# Patient Record
Sex: FEMALE | Race: WHITE | Employment: FULL TIME
[De-identification: names, ages, dates, MRNs, and addresses within clinical notes are randomized per-mention and may not be internally consistent; named-entity substitution may affect disease eponyms.]

---

## 2018-04-04 ENCOUNTER — NURSE TRIAGE (OUTPATIENT)
Dept: OTHER | Facility: CLINIC | Age: 23
End: 2018-04-04

## 2018-04-05 ENCOUNTER — OFFICE VISIT (OUTPATIENT)
Dept: FAMILY MEDICINE CLINIC | Age: 23
End: 2018-04-05

## 2018-04-05 VITALS
WEIGHT: 232 LBS | DIASTOLIC BLOOD PRESSURE: 70 MMHG | RESPIRATION RATE: 16 BRPM | BODY MASS INDEX: 37.28 KG/M2 | SYSTOLIC BLOOD PRESSURE: 108 MMHG | HEIGHT: 66 IN | HEART RATE: 91 BPM | OXYGEN SATURATION: 98 %

## 2018-04-05 DIAGNOSIS — F43.10 PTSD (POST-TRAUMATIC STRESS DISORDER): ICD-10-CM

## 2018-04-05 DIAGNOSIS — F41.0 PANIC ATTACK: Primary | ICD-10-CM

## 2018-04-05 DIAGNOSIS — F41.1 GAD (GENERALIZED ANXIETY DISORDER): ICD-10-CM

## 2018-04-05 PROCEDURE — 99203 OFFICE O/P NEW LOW 30 MIN: CPT | Performed by: FAMILY MEDICINE

## 2018-04-05 PROCEDURE — G0444 DEPRESSION SCREEN ANNUAL: HCPCS | Performed by: FAMILY MEDICINE

## 2018-04-05 RX ORDER — HYDROXYZINE PAMOATE 25 MG/1
25 CAPSULE ORAL 3 TIMES DAILY PRN
COMMUNITY
End: 2018-04-25

## 2018-04-05 RX ORDER — BUSPIRONE HYDROCHLORIDE 15 MG/1
15 TABLET ORAL 3 TIMES DAILY
Status: ON HOLD | COMMUNITY
End: 2018-06-25

## 2018-04-05 RX ORDER — ESCITALOPRAM OXALATE 10 MG/1
10 TABLET ORAL DAILY
Status: ON HOLD | COMMUNITY
End: 2018-06-25 | Stop reason: HOSPADM

## 2018-04-05 RX ORDER — LORAZEPAM 0.5 MG/1
0.5 TABLET ORAL EVERY 8 HOURS PRN
Qty: 30 TABLET | Refills: 1 | Status: SHIPPED | OUTPATIENT
Start: 2018-04-05 | End: 2018-05-05

## 2018-04-05 ASSESSMENT — PATIENT HEALTH QUESTIONNAIRE - PHQ9
1. LITTLE INTEREST OR PLEASURE IN DOING THINGS: 1
9. THOUGHTS THAT YOU WOULD BE BETTER OFF DEAD, OR OF HURTING YOURSELF: 0
SUM OF ALL RESPONSES TO PHQ QUESTIONS 1-9: 19
2. FEELING DOWN, DEPRESSED OR HOPELESS: 2
3. TROUBLE FALLING OR STAYING ASLEEP: 3
7. TROUBLE CONCENTRATING ON THINGS, SUCH AS READING THE NEWSPAPER OR WATCHING TELEVISION: 3
5. POOR APPETITE OR OVEREATING: 2
6. FEELING BAD ABOUT YOURSELF - OR THAT YOU ARE A FAILURE OR HAVE LET YOURSELF OR YOUR FAMILY DOWN: 3
8. MOVING OR SPEAKING SO SLOWLY THAT OTHER PEOPLE COULD HAVE NOTICED. OR THE OPPOSITE, BEING SO FIGETY OR RESTLESS THAT YOU HAVE BEEN MOVING AROUND A LOT MORE THAN USUAL: 2
SUM OF ALL RESPONSES TO PHQ9 QUESTIONS 1 & 2: 3
10. IF YOU CHECKED OFF ANY PROBLEMS, HOW DIFFICULT HAVE THESE PROBLEMS MADE IT FOR YOU TO DO YOUR WORK, TAKE CARE OF THINGS AT HOME, OR GET ALONG WITH OTHER PEOPLE: 3
4. FEELING TIRED OR HAVING LITTLE ENERGY: 3

## 2018-04-05 ASSESSMENT — ENCOUNTER SYMPTOMS
RESPIRATORY NEGATIVE: 1
GASTROINTESTINAL NEGATIVE: 1

## 2018-04-10 RX ORDER — BUPROPION HYDROCHLORIDE 150 MG/1
150 TABLET ORAL EVERY MORNING
Qty: 30 TABLET | Refills: 1 | Status: SHIPPED | OUTPATIENT
Start: 2018-04-10 | End: 2018-04-25 | Stop reason: SDUPTHER

## 2018-04-13 ENCOUNTER — TELEPHONE (OUTPATIENT)
Dept: FAMILY MEDICINE CLINIC | Age: 23
End: 2018-04-13

## 2018-04-16 ENCOUNTER — TELEPHONE (OUTPATIENT)
Dept: FAMILY MEDICINE CLINIC | Age: 23
End: 2018-04-16

## 2018-04-25 ENCOUNTER — OFFICE VISIT (OUTPATIENT)
Dept: FAMILY MEDICINE CLINIC | Age: 23
End: 2018-04-25

## 2018-04-25 VITALS
HEIGHT: 66 IN | OXYGEN SATURATION: 98 % | RESPIRATION RATE: 16 BRPM | HEART RATE: 84 BPM | SYSTOLIC BLOOD PRESSURE: 108 MMHG | BODY MASS INDEX: 37.77 KG/M2 | WEIGHT: 235 LBS | DIASTOLIC BLOOD PRESSURE: 70 MMHG

## 2018-04-25 DIAGNOSIS — F41.1 GAD (GENERALIZED ANXIETY DISORDER): Primary | ICD-10-CM

## 2018-04-25 DIAGNOSIS — F33.41 RECURRENT MAJOR DEPRESSIVE DISORDER, IN PARTIAL REMISSION (HCC): ICD-10-CM

## 2018-04-25 PROCEDURE — 99213 OFFICE O/P EST LOW 20 MIN: CPT | Performed by: FAMILY MEDICINE

## 2018-04-25 RX ORDER — BUPROPION HYDROCHLORIDE 150 MG/1
150 TABLET ORAL 2 TIMES DAILY
Qty: 180 TABLET | Refills: 0 | Status: ON HOLD | OUTPATIENT
Start: 2018-04-25 | End: 2018-06-25

## 2018-04-25 ASSESSMENT — ENCOUNTER SYMPTOMS
GASTROINTESTINAL NEGATIVE: 1
RESPIRATORY NEGATIVE: 1

## 2018-06-22 ENCOUNTER — NURSE TRIAGE (OUTPATIENT)
Dept: OTHER | Facility: CLINIC | Age: 23
End: 2018-06-22

## 2018-06-23 PROBLEM — Z78.9 ELECTRONIC CIGARETTE USE: Status: ACTIVE | Noted: 2018-06-23

## 2018-06-23 PROBLEM — F39 MOOD DISORDER (HCC): Status: ACTIVE | Noted: 2018-06-23

## 2018-06-25 PROBLEM — F39 MOOD DISORDER (HCC): Status: RESOLVED | Noted: 2018-06-23 | Resolved: 2018-06-25

## 2018-06-25 PROBLEM — Z78.9 ELECTRONIC CIGARETTE USE: Status: RESOLVED | Noted: 2018-06-23 | Resolved: 2018-06-25

## 2018-07-05 ENCOUNTER — OFFICE VISIT (OUTPATIENT)
Dept: FAMILY MEDICINE CLINIC | Age: 23
End: 2018-07-05

## 2018-07-05 VITALS
DIASTOLIC BLOOD PRESSURE: 80 MMHG | BODY MASS INDEX: 39.65 KG/M2 | WEIGHT: 238 LBS | SYSTOLIC BLOOD PRESSURE: 110 MMHG | HEIGHT: 65 IN

## 2018-07-05 DIAGNOSIS — F41.1 GAD (GENERALIZED ANXIETY DISORDER): ICD-10-CM

## 2018-07-05 DIAGNOSIS — F33.41 RECURRENT MAJOR DEPRESSIVE DISORDER, IN PARTIAL REMISSION (HCC): Primary | ICD-10-CM

## 2018-07-05 PROCEDURE — 99214 OFFICE O/P EST MOD 30 MIN: CPT | Performed by: FAMILY MEDICINE

## 2018-07-05 ASSESSMENT — ENCOUNTER SYMPTOMS
RESPIRATORY NEGATIVE: 1
GASTROINTESTINAL NEGATIVE: 1

## 2018-07-05 NOTE — PROGRESS NOTES
Subjective:      Patient ID: Elzbieta Smalls is a 23 y.o. female. HPI  Depression  Pt was admitted to [facility] psych   She went to the ER thinking of hurting herself   Her parents took her  She was admitted and had therapy  She is feeling better  They changed her meds to wellbutrin to xl   No longer feeling suicidal  She is going to therapy  Needs names of therapist   She got a new job at [employer]  She is very excited about this  Going to do group therapy with her parents   Review of Systems   Constitutional: Negative. Respiratory: Negative. Cardiovascular: Negative. Gastrointestinal: Negative. Skin: Negative. Neurological: Negative. Psychiatric/Behavioral: Positive for agitation and decreased concentration. YOB: 1995    Date of Visit:  7/5/2018    No Known Allergies    Outpatient Prescriptions Marked as Taking for the 7/5/18 encounter (Office Visit) with Toro Barrett, DO   Medication Sig Dispense Refill    busPIRone (BUSPAR) 15 MG tablet Take 15 mg by mouth 3 times daily 90 tablet 0    buPROPion (WELLBUTRIN XL) 300 MG extended release tablet Take 1 tablet by mouth every morning 30 tablet 0       Vitals:    07/05/18 1138   BP: 110/80   Weight: 238 lb (108 kg)   Height: 5' 5\" (1.651 m)     Body mass index is 39.61 kg/m². Wt Readings from Last 3 Encounters:   07/05/18 238 lb (108 kg)   06/23/18 190 lb (86.2 kg)   06/22/18 235 lb 3.7 oz (106.7 kg)     BP Readings from Last 3 Encounters:   07/05/18 110/80   06/25/18 110/73   06/23/18 118/82       Objective:   Physical Exam   Constitutional: She is oriented to person, place, and time. She appears well-developed and well-nourished. HENT:   Head: Normocephalic. Neck: No thyromegaly present. Cardiovascular: Normal rate, regular rhythm and normal heart sounds. Pulmonary/Chest: Effort normal and breath sounds normal.   Lymphadenopathy:     She has no cervical adenopathy.    Neurological: She is alert and oriented to person, place, and time. Psychiatric: She has a normal mood and affect. Her behavior is normal. Judgment and thought content normal.   Smiling  Interactive    Nursing note and vitals reviewed.       Assessment:      Assessment/plan;  Sam Rivera was seen today for follow-up from hospital.    Diagnoses and all orders for this visit:    Recurrent major depressive disorder, in partial remission ([facility] [address]    GAVINO (generalized anxiety disorder)      Support given  Given names of therapist   Call if increasing symptoms

## 2018-10-03 ENCOUNTER — PATIENT MESSAGE (OUTPATIENT)
Dept: FAMILY MEDICINE CLINIC | Age: 23
End: 2018-10-03

## 2018-10-04 RX ORDER — BUPROPION HYDROCHLORIDE 300 MG/1
300 TABLET ORAL EVERY MORNING
Qty: 30 TABLET | Refills: 5 | Status: SHIPPED | OUTPATIENT
Start: 2018-10-04 | End: 2018-11-20 | Stop reason: SDUPTHER

## 2019-11-08 ENCOUNTER — OFFICE VISIT (OUTPATIENT)
Dept: FAMILY MEDICINE CLINIC | Age: 24
End: 2019-11-08
Payer: COMMERCIAL

## 2019-11-08 ENCOUNTER — TELEPHONE (OUTPATIENT)
Dept: FAMILY MEDICINE CLINIC | Age: 24
End: 2019-11-08

## 2019-11-08 VITALS
BODY MASS INDEX: 40.77 KG/M2 | SYSTOLIC BLOOD PRESSURE: 110 MMHG | HEART RATE: 78 BPM | DIASTOLIC BLOOD PRESSURE: 64 MMHG | OXYGEN SATURATION: 100 % | WEIGHT: 245 LBS

## 2019-11-08 DIAGNOSIS — J01.90 ACUTE BACTERIAL SINUSITIS: Primary | ICD-10-CM

## 2019-11-08 DIAGNOSIS — B96.89 ACUTE BACTERIAL SINUSITIS: Primary | ICD-10-CM

## 2019-11-08 PROCEDURE — 99213 OFFICE O/P EST LOW 20 MIN: CPT | Performed by: FAMILY MEDICINE

## 2019-11-08 RX ORDER — AZITHROMYCIN 250 MG/1
250 TABLET, FILM COATED ORAL SEE ADMIN INSTRUCTIONS
Qty: 6 TABLET | Refills: 0 | Status: SHIPPED | OUTPATIENT
Start: 2019-11-08 | End: 2019-11-13

## 2019-11-08 RX ORDER — PROMETHAZINE HYDROCHLORIDE AND CODEINE PHOSPHATE 6.25; 1 MG/5ML; MG/5ML
5 SYRUP ORAL EVERY 4 HOURS PRN
Qty: 118 ML | Refills: 0 | Status: SHIPPED | OUTPATIENT
Start: 2019-11-08 | End: 2019-11-15

## 2019-11-10 ASSESSMENT — ENCOUNTER SYMPTOMS
SINUS PRESSURE: 1
HOARSE VOICE: 1
COUGH: 1

## 2019-11-20 ENCOUNTER — OFFICE VISIT (OUTPATIENT)
Dept: FAMILY MEDICINE CLINIC | Age: 24
End: 2019-11-20
Payer: COMMERCIAL

## 2019-11-20 VITALS
HEIGHT: 65 IN | SYSTOLIC BLOOD PRESSURE: 110 MMHG | TEMPERATURE: 98.8 F | DIASTOLIC BLOOD PRESSURE: 70 MMHG | BODY MASS INDEX: 41.32 KG/M2 | WEIGHT: 248 LBS

## 2019-11-20 DIAGNOSIS — L25.9 CONTACT DERMATITIS, UNSPECIFIED CONTACT DERMATITIS TYPE, UNSPECIFIED TRIGGER: Primary | ICD-10-CM

## 2019-11-20 PROCEDURE — 99213 OFFICE O/P EST LOW 20 MIN: CPT | Performed by: FAMILY MEDICINE

## 2019-11-20 RX ORDER — PREDNISONE 10 MG/1
10 TABLET ORAL DAILY
Qty: 18 TABLET | Refills: 0 | Status: SHIPPED | OUTPATIENT
Start: 2019-11-20 | End: 2020-11-19

## 2019-11-20 RX ORDER — MOMETASONE FUROATE 1 MG/G
CREAM TOPICAL
Qty: 50 G | Refills: 1 | Status: SHIPPED | OUTPATIENT
Start: 2019-11-20

## 2020-07-14 ENCOUNTER — E-VISIT (OUTPATIENT)
Dept: FAMILY MEDICINE CLINIC | Age: 25
End: 2020-07-14
Payer: COMMERCIAL

## 2020-07-14 PROCEDURE — 99421 OL DIG E/M SVC 5-10 MIN: CPT | Performed by: FAMILY MEDICINE

## 2020-07-14 RX ORDER — NITROFURANTOIN 25; 75 MG/1; MG/1
100 CAPSULE ORAL 2 TIMES DAILY
Qty: 10 CAPSULE | Refills: 0 | Status: SHIPPED | OUTPATIENT
Start: 2020-07-14 | End: 2020-07-19

## 2020-07-22 ENCOUNTER — TELEPHONE (OUTPATIENT)
Dept: FAMILY MEDICINE CLINIC | Age: 25
End: 2020-07-22

## 2020-07-22 NOTE — TELEPHONE ENCOUNTER
Patient's Partner, Magdalena Julio on [facility], is requesting an urgent returned call from Dr. Aurelio Woods. States patient is having mental health issues and she needs Dr. Jose Cruz Godoy opinion before she takes her somewhere to get help.

## 2020-07-23 ENCOUNTER — PATIENT MESSAGE (OUTPATIENT)
Dept: FAMILY MEDICINE CLINIC | Age: 25
End: 2020-07-23

## 2020-07-23 NOTE — TELEPHONE ENCOUNTER
Pt called back with a different number [phone] to call her back. Pt is having really bad anger issues and  uncontrollable emotions. She is looking to get back on some meds. Please give pt a call.

## 2020-07-23 NOTE — TELEPHONE ENCOUNTER
LMOM for patient to call office to make an appt. Dr Maurice Pena would like to set her up with a MyChart visit tomorrow 7/24 in the 3:45 red clinic spot.

## 2020-07-23 NOTE — TELEPHONE ENCOUNTER
From: Giovanni Oh  To: Ethel Wallace DO  Sent: 7/23/2020 10:33 AM EDT  Subject: Non-Urgent Medical Question    Hello,  I called yesterday and asked to see doctor Cris Koyanagi due to my manic episodes and talk about a new plan we maybe have to do for my depression     Thank,   Giovanni Oh

## 2020-07-24 ENCOUNTER — TELEMEDICINE (OUTPATIENT)
Dept: FAMILY MEDICINE CLINIC | Age: 25
End: 2020-07-24
Payer: COMMERCIAL

## 2020-07-24 PROCEDURE — 99214 OFFICE O/P EST MOD 30 MIN: CPT | Performed by: FAMILY MEDICINE

## 2020-07-24 RX ORDER — LORAZEPAM 0.5 MG/1
0.5 TABLET ORAL EVERY 8 HOURS PRN
Qty: 20 TABLET | Refills: 0 | Status: SHIPPED | OUTPATIENT
Start: 2020-07-24 | End: 2020-08-12 | Stop reason: SDUPTHER

## 2020-07-24 RX ORDER — BUPROPION HYDROCHLORIDE 150 MG/1
150 TABLET ORAL EVERY MORNING
Qty: 30 TABLET | Refills: 3 | Status: SHIPPED | OUTPATIENT
Start: 2020-07-24

## 2020-07-24 NOTE — PROGRESS NOTES
2020    TELEHEALTH EVALUATION -- Audio/Visual (During [facility]DBJC-98 public health emergency)    HPI:    Felton Skiff (:  1995) has requested an audio/video evaluation for the following concern(s): Anxiety     She has a history of depression and anxiety  She has been off medicine for a few years  She had about one week of \"manic\" episode  Could not settle down  Was very angry for no reason   Her fiance Raf Carter stayed with her the whole time   She punched hole in wall  Did not go to work  She was angry the whole time   Feeling better now but down  She was on wellbutrin in the past and this did help   Was having panic attacks and anxiety        Review of Systems   Constitutional: Positive for activity change and fatigue. Psychiatric/Behavioral: Positive for agitation, behavioral problems, decreased concentration and sleep disturbance. The patient is nervous/anxious. Prior to Visit Medications    Medication Sig Taking? Authorizing Provider   predniSONE (DELTASONE) 10 MG tablet Take 1 tablet by mouth daily 3 pills q days 1-3  2 pills q days 4-6 1 pill q days 7-9  Rory Cruz DO   mometasone (ELOCON) 0.1 % cream Apply bid to affected area  Levi Lindsey, DO       Social History     Tobacco Use    Smoking status: Never Smoker    Smokeless tobacco: Never Used   Substance Use Topics    Alcohol use: Yes     Alcohol/week: 3.0 standard drinks     Types: 2 Glasses of wine, 1 Cans of beer per week    Drug use:  No            PHYSICAL EXAMINATION:  [ INSTRUCTIONS:  \"[x]\" Indicates a positive item  \"[]\" Indicates a negative item  -- DELETE ALL ITEMS NOT EXAMINED]  Vital Signs: (As obtained by patient/caregiver or practitioner observation)    Blood pressure-  Heart rate-    Respiratory rate-    Temperature-  Pulse oximetry-     Constitutional: [x] Appears well-developed and well-nourished [x] No apparent distress      [] Abnormal-   Mental status  [x] Alert and awake  [x] Oriented to person/place/time [x]Able to follow commands      Eyes:  EOM    []  Normal  [] Abnormal-  Sclera  []  Normal  [] Abnormal -         Discharge []  None visible  [] Abnormal -    HENT:   [x] Normocephalic, atraumatic. [] Abnormal   [] Mouth/Throat: Mucous membranes are moist.     External Ears [] Normal  [] Abnormal-     Neck: [] No visualized mass     Pulmonary/Chest: [x] Respiratory effort normal.  [x] No visualized signs of difficulty breathing or respiratory distress        [] Abnormal-      Musculoskeletal:   [] Normal gait with no signs of ataxia         [] Normal range of motion of neck        [] Abnormal-       Neurological:        [x] No Facial Asymmetry (Cranial nerve 7 motor function) (limited exam to video visit)          [] No gaze palsy        [] Abnormal-         Skin:        [x] No significant exanthematous lesions or discoloration noted on facial skin         [] Abnormal-            Psychiatric:       [x] Normal Affect [x] No Hallucinations        [] Abnormal-     Other pertinent observable physical exam findings-     ASSESSMENT/PLAN:  Assessment/plan;  Diagnoses and all orders for this visit:    GAVINO (generalized anxiety disorder)  -     LORazepam (ATIVAN) 0.5 MG tablet; Take 1 tablet by mouth every 8 hours as needed for Anxiety for up to 10 days. Other orders  -     buPROPion (WELLBUTRIN XL) 150 MG extended release tablet; Take 1 tablet by mouth every morning      Recheck in two weeks  Note for work  Her fiance on the video visit also and agrees she will manage the ativan and only give them to her one at a time when needed  Nicol Yates is a 23 y.o. female being evaluated by a Virtual Visit (video visit) encounter to address concerns as mentioned above. A caregiver was present when appropriate. Due to this being a TeleHealth encounter (During [facility]-52 public health emergency), evaluation of the following organ systems was limited: Vitals/Constitutional/EENT/Resp/CV/GI//MS/Neuro/Skin/Heme-Lymph-Imm. Pursuant to the emergency declaration under the [facility], [address] [place] [address] authority and the [employer] and [employer] Act, this Virtual Visit was conducted with patient's (and/or legal guardian's) consent, to reduce the patient's risk of exposure to COVID-19 and provide necessary medical care. The patient (and/or legal guardian) has also been advised to contact this office for worsening conditions or problems, and seek emergency medical treatment and/or call 911 if deemed necessary. Patient identification was verified at the start of the visit: Yes    Total time spent on this encounter: 20 minutes    Services were provided through a video synchronous discussion virtually to substitute for in-person clinic visit. Patient and provider were located at their individual homes. --[address] [place] [address],  on 7/24/2020 at 3:45 PM    An electronic signature was used to authenticate this note.

## 2020-07-24 NOTE — LETTER
[facility]  [facility] [address] [facility],First Floor [address]  Phone: [phone]  Fax: [phone]    Nia Velez DO        July 24, 2020     Patient: Lyndsay Frye   YOB: 1995   Date of Visit: 7/24/2020       To Whom it May Concern:    Lyndsay Frye has an excuse from work for medical reasons 7/17/20 through 8/2/20. Return to work 8/3/20. If you have any questions or concerns, please don't hesitate to call.     Sincerely,         Nai Velez DO

## 2020-07-28 ENCOUNTER — PATIENT MESSAGE (OUTPATIENT)
Dept: FAMILY MEDICINE CLINIC | Age: 25
End: 2020-07-28

## 2020-07-29 RX ORDER — DESVENLAFAXINE 50 MG/1
50 TABLET, EXTENDED RELEASE ORAL DAILY
Qty: 30 TABLET | Refills: 3 | Status: SHIPPED | OUTPATIENT
Start: 2020-07-29 | End: 2020-09-02 | Stop reason: SDUPTHER

## 2020-07-29 NOTE — TELEPHONE ENCOUNTER
From: Jean Luke  To: Sujit Meyer DO  Sent: 7/28/2020 11:42 PM EDT  Subject: Prescription Question    I have been taking the Wellbutrin and I am having no change of behavior over the day but at night I can't sleep and when I do, I have severe nightmares. I tried the Ativan at night to keep my calm to help me sleep but it didn't affect me at all. The nightmares are the worst and make me afraid to fall asleep at night.

## 2020-08-07 ENCOUNTER — TELEMEDICINE (OUTPATIENT)
Dept: FAMILY MEDICINE CLINIC | Age: 25
End: 2020-08-07
Payer: COMMERCIAL

## 2020-08-07 PROCEDURE — 99213 OFFICE O/P EST LOW 20 MIN: CPT | Performed by: FAMILY MEDICINE

## 2020-08-07 ASSESSMENT — ENCOUNTER SYMPTOMS
RESPIRATORY NEGATIVE: 1
GASTROINTESTINAL NEGATIVE: 1

## 2020-08-07 NOTE — PROGRESS NOTES
5Subjective:      2020    TELEHEALTH EVALUATION -- Audio/Visual (During XPUFG-66 public health emergency)    HPI:    Marylee Rival (:  1995) has requested an audio/video evaluation for the following concern(s): Anxiety  Depression  She is doing much better on the pristiq  Getting  in two weeks in [place]  Really excited about this  The medication is helping her sleep better  She has some headaches and blurry vision but gettigng better  Not needing the anxiety medication    Review of Systems   Constitutional: Negative. HENT: Negative. Respiratory: Negative. Cardiovascular: Negative. Gastrointestinal: Negative. Neurological: Negative. Psychiatric/Behavioral: Positive for agitation and decreased concentration. Prior to Visit Medications    Medication Sig Taking? Authorizing Provider   desvenlafaxine succinate (PRISTIQ) 50 MG TB24 extended release tablet Take 1 tablet by mouth daily  Rory Cruz DO   buPROPion (WELLBUTRIN XL) 150 MG extended release tablet Take 1 tablet by mouth every morning  Rory Cruz DO   predniSONE (DELTASONE) 10 MG tablet Take 1 tablet by mouth daily 3 pills q days 1-3  2 pills q days 4-6 1 pill q days 7-9  Rory Cruz,    mometasone (ELOCON) 0.1 % cream Apply bid to affected area  Leigha Garcia DO       Social History     Tobacco Use    Smoking status: Never Smoker    Smokeless tobacco: Never Used   Substance Use Topics    Alcohol use: Yes     Alcohol/week: 3.0 standard drinks     Types: 2 Glasses of wine, 1 Cans of beer per week    Drug use:  No            PHYSICAL EXAMINATION:  [ INSTRUCTIONS:  \"[x]\" Indicates a positive item  \"[]\" Indicates a negative item  -- DELETE ALL ITEMS NOT EXAMINED]  Vital Signs: (As obtained by patient/caregiver or practitioner observation)    Blood pressure-  Heart rate-    Respiratory rate-    Temperature-  Pulse oximetry-     Constitutional: [x] Appears well-developed and well-nourished [x] No apparent distress      [] Abnormal-   Mental status  [x] Alert and awake  [x] Oriented to person/place/time [x]Able to follow commands      Eyes:  EOM    []  Normal  [] Abnormal-  Sclera  []  Normal  [] Abnormal -         Discharge []  None visible  [] Abnormal -    HENT:   [x] Normocephalic, atraumatic. [] Abnormal   [] Mouth/Throat: Mucous membranes are moist.     External Ears [] Normal  [] Abnormal-     Neck: [] No visualized mass     Pulmonary/Chest: [x] Respiratory effort normal.  [x] No visualized signs of difficulty breathing or respiratory distress        [] Abnormal-      Musculoskeletal:   [] Normal gait with no signs of ataxia         [] Normal range of motion of neck        [] Abnormal-       Neurological:        [x] No Facial Asymmetry (Cranial nerve 7 motor function) (limited exam to video visit)          [] No gaze palsy        [] Abnormal-         Skin:        [x] No significant exanthematous lesions or discoloration noted on facial skin         [] Abnormal-            Psychiatric:       [x] Normal Affect [x] No Hallucinations        [] Abnormal-     Other pertinent observable physical exam findings-     ASSESSMENT/PLAN:  Assessment/plan;  Douglas Oh was seen today for anxiety and depression. Diagnoses and all orders for this visit:    GAVINO (generalized anxiety disorder)    Reactive depression      No follow-ups on file. Meghan Golden is a 23 y.o. female being evaluated by a Virtual Visit (video visit) encounter to address concerns as mentioned above. A caregiver was present when appropriate. Due to this being a TeleHealth encounter (During [facility]-08 public health emergency), evaluation of the following organ systems was limited: Vitals/Constitutional/EENT/Resp/CV/GI//MS/Neuro/Skin/Heme-Lymph-Imm.   Pursuant to the emergency declaration under the [address], [address] [place] [address] authority and the [employer] and [employer], this Virtual Visit was conducted with patient's (and/or legal guardian's) consent, to reduce the patient's risk of exposure to COVID-19 and provide necessary medical care. The patient (and/or legal guardian) has also been advised to contact this office for worsening conditions or problems, and seek emergency medical treatment and/or call 911 if deemed necessary. Patient identification was verified at the start of the visit: Yes    Total time spent on this encounter: Not billed by time    Services were provided through a video synchronous discussion virtually to substitute for in-person clinic visit. Patient and provider were located at their individual homes. --Paul Parson DO on 8/7/2020 at 2:04 PM    An electronic signature was used to authenticate this note.

## 2020-08-12 ENCOUNTER — PATIENT MESSAGE (OUTPATIENT)
Dept: FAMILY MEDICINE CLINIC | Age: 25
End: 2020-08-12

## 2020-08-12 RX ORDER — LORAZEPAM 0.5 MG/1
0.5 TABLET ORAL EVERY 8 HOURS PRN
Qty: 20 TABLET | Refills: 1 | Status: SHIPPED | OUTPATIENT
Start: 2020-08-12 | End: 2020-08-22

## 2020-08-12 NOTE — TELEPHONE ENCOUNTER
From: Leatha Titus  To: Niharika Dad, DO  Sent: 8/12/2020 2:45 PM EDT  Subject: Prescription Question    Hello,  I just wanted to get in touch with you bc I am down to 3 lorazepam. I have been using them to help me sleep bc of my nightmares, just one before bed when I need to sleep. Do you suggest I keep using this and get a refill or try something else?      Thanks,  Lizette Nolan

## 2020-09-02 RX ORDER — DESVENLAFAXINE 50 MG/1
50 TABLET, EXTENDED RELEASE ORAL DAILY
Qty: 30 TABLET | Refills: 3 | Status: SHIPPED | OUTPATIENT
Start: 2020-09-02 | End: 2020-09-06 | Stop reason: SDUPTHER

## 2020-09-06 RX ORDER — DESVENLAFAXINE 50 MG/1
50 TABLET, EXTENDED RELEASE ORAL DAILY
Qty: 30 TABLET | Refills: 3 | Status: SHIPPED | OUTPATIENT
Start: 2020-09-06

## 2020-09-09 ENCOUNTER — TELEPHONE (OUTPATIENT)
Dept: ORTHOPEDIC SURGERY | Age: 25
End: 2020-09-09